# Patient Record
Sex: FEMALE | Race: WHITE | NOT HISPANIC OR LATINO | Employment: FULL TIME | URBAN - METROPOLITAN AREA
[De-identification: names, ages, dates, MRNs, and addresses within clinical notes are randomized per-mention and may not be internally consistent; named-entity substitution may affect disease eponyms.]

---

## 2019-10-25 ENCOUNTER — HOSPITAL ENCOUNTER (EMERGENCY)
Facility: MEDICAL CENTER | Age: 44
End: 2019-10-25

## 2019-10-25 VITALS
DIASTOLIC BLOOD PRESSURE: 81 MMHG | BODY MASS INDEX: 34.97 KG/M2 | OXYGEN SATURATION: 98 % | SYSTOLIC BLOOD PRESSURE: 129 MMHG | TEMPERATURE: 96.1 F | HEART RATE: 77 BPM | WEIGHT: 217.59 LBS | HEIGHT: 66 IN | RESPIRATION RATE: 18 BRPM

## 2019-10-25 PROCEDURE — 302449 STATCHG TRIAGE ONLY (STATISTIC)

## 2019-10-25 ASSESSMENT — PAIN SCALES - WONG BAKER: WONGBAKER_NUMERICALRESPONSE: DOESN'T HURT AT ALL

## 2019-12-05 ENCOUNTER — HOSPITAL ENCOUNTER (EMERGENCY)
Facility: MEDICAL CENTER | Age: 44
End: 2019-12-06
Attending: EMERGENCY MEDICINE
Payer: MEDICAID

## 2019-12-05 ENCOUNTER — HOSPITAL ENCOUNTER (EMERGENCY)
Facility: MEDICAL CENTER | Age: 44
End: 2019-12-05
Attending: EMERGENCY MEDICINE
Payer: MEDICAID

## 2019-12-05 VITALS
WEIGHT: 225.97 LBS | SYSTOLIC BLOOD PRESSURE: 174 MMHG | RESPIRATION RATE: 20 BRPM | TEMPERATURE: 98.6 F | OXYGEN SATURATION: 99 % | DIASTOLIC BLOOD PRESSURE: 96 MMHG | HEART RATE: 120 BPM | BODY MASS INDEX: 36.47 KG/M2

## 2019-12-05 VITALS
HEART RATE: 107 BPM | HEIGHT: 66 IN | SYSTOLIC BLOOD PRESSURE: 153 MMHG | DIASTOLIC BLOOD PRESSURE: 81 MMHG | TEMPERATURE: 99.1 F | OXYGEN SATURATION: 96 % | WEIGHT: 223.99 LBS | BODY MASS INDEX: 36 KG/M2 | RESPIRATION RATE: 16 BRPM

## 2019-12-05 DIAGNOSIS — K08.89 PAIN, DENTAL: ICD-10-CM

## 2019-12-05 PROCEDURE — A9270 NON-COVERED ITEM OR SERVICE: HCPCS | Performed by: EMERGENCY MEDICINE

## 2019-12-05 PROCEDURE — 700102 HCHG RX REV CODE 250 W/ 637 OVERRIDE(OP): Performed by: EMERGENCY MEDICINE

## 2019-12-05 PROCEDURE — 99284 EMERGENCY DEPT VISIT MOD MDM: CPT

## 2019-12-05 PROCEDURE — 700111 HCHG RX REV CODE 636 W/ 250 OVERRIDE (IP): Performed by: EMERGENCY MEDICINE

## 2019-12-05 PROCEDURE — 96372 THER/PROPH/DIAG INJ SC/IM: CPT

## 2019-12-05 PROCEDURE — 99281 EMR DPT VST MAYX REQ PHY/QHP: CPT

## 2019-12-05 RX ORDER — AMOXICILLIN AND CLAVULANATE POTASSIUM 875; 125 MG/1; MG/1
1 TABLET, FILM COATED ORAL ONCE
Status: COMPLETED | OUTPATIENT
Start: 2019-12-05 | End: 2019-12-05

## 2019-12-05 RX ORDER — KETOROLAC TROMETHAMINE 30 MG/ML
15 INJECTION, SOLUTION INTRAMUSCULAR; INTRAVENOUS ONCE
Status: COMPLETED | OUTPATIENT
Start: 2019-12-05 | End: 2019-12-05

## 2019-12-05 RX ORDER — IBUPROFEN 600 MG/1
600 TABLET ORAL EVERY 6 HOURS PRN
Qty: 30 TAB | Refills: 0 | Status: SHIPPED | OUTPATIENT
Start: 2019-12-05

## 2019-12-05 RX ORDER — AMOXICILLIN AND CLAVULANATE POTASSIUM 875; 125 MG/1; MG/1
1 TABLET, FILM COATED ORAL 4 TIMES DAILY
Qty: 40 TAB | Refills: 0 | Status: SHIPPED | OUTPATIENT
Start: 2019-12-05 | End: 2019-12-15

## 2019-12-05 RX ADMIN — AMOXICILLIN AND CLAVULANATE POTASSIUM 1 TABLET: 875; 125 TABLET, FILM COATED ORAL at 02:19

## 2019-12-05 RX ADMIN — KETOROLAC TROMETHAMINE 15 MG: 30 INJECTION, SOLUTION INTRAMUSCULAR at 02:19

## 2019-12-05 ASSESSMENT — LIFESTYLE VARIABLES: DO YOU DRINK ALCOHOL: NO

## 2019-12-05 NOTE — ED NOTES
"Update to history of present illness: patient has a dental partial at top left that has been loose for 1x month, states she had to re-glue it every day (denture adhesive).  Says it looks \"red and irritated.\"  Also reports that the cap smelled really bad when it first came out.    "

## 2019-12-05 NOTE — ED TRIAGE NOTES
"Chief Complaint   Patient presents with   • Facial Pain     patient ambulatory to triage, states her face hurts, denies any trauma, says she is having sinus pain and a headache. Claims she was sick recently. Denies any productive cough, fevers or n/v     /99   Pulse (!) 115   Temp 37.3 °C (99.1 °F) (Oral)   Resp 18   Ht 1.676 m (5' 6\")   Wt 101.6 kg (223 lb 15.8 oz)   SpO2 94%     Patient educated on ed triage process, instructed to notify staff of any new or worsening symptoms  "

## 2019-12-05 NOTE — ED NOTES
DC home with written and verbal instructions regarding f/u, activity and RX.  Verbalized understanding, ambulated out with steady gait, all belongings and ice pack.

## 2019-12-05 NOTE — ED PROVIDER NOTES
ED Provider Note    CHIEF COMPLAINT  Chief Complaint   Patient presents with   • Facial Pain     patient ambulatory to triage, states her face hurts, denies any trauma, says she is having sinus pain and a headache. Claims she was sick recently. Denies any productive cough, fevers or n/v       HPI  Christinia Lynn Schoenauer is a 44 y.o. female who presents with cc of dental pain. Patient reports that she has had dental pain.  Patient had a partial placed remotely, she reports it fell out and she has been using dental cement to help keep it in place, she reports it continues to fall out and she is developed considerable dental pain over the last few days.  She denies any facial swelling.  She denies any eye pain or pain on eye movements.  She reports some mild pain at the lip immediately overlying the infected tooth.  The tooth is the left canine.  She denies any difficulty breathing or swallowing.  She denies any fevers or constitutional symptoms.    REVIEW OF SYSTEMS  ROS  See HPI for further details. All other systems are negative.     PAST MEDICAL HISTORY       SOCIAL HISTORY  Social History     Tobacco Use   • Smoking status: Current Every Day Smoker     Packs/day: 0.50     Types: Cigarettes   • Smokeless tobacco: Never Used   Substance and Sexual Activity   • Alcohol use: Not Currently   • Drug use: Never   • Sexual activity: Not on file       SURGICAL HISTORY  patient denies any surgical history    CURRENT MEDICATIONS  Home Medications     Reviewed by Allison Vigil R.N. (Registered Nurse) on 12/05/19 at 0117  Med List Status: <None>   Medication Last Dose Status        Patient Mode Taking any Medications                       ALLERGIES  No Known Allergies    PHYSICAL EXAM  Physical Exam   Constitutional: She is oriented to person, place, and time. She appears well-developed and well-nourished.   HENT:   Head: Normocephalic and atraumatic.   Left canine with partial implant in place, I had patient remove  this which reveals a tooth with associated caries and associated buccal abscess.  There is some mild pain in the lip overlying this area but no pain at the maxillary sinus on percussion.  Full range of motion of extraocular muscles without any pain evoked.  Sclerae unremarkable.   Eyes: Conjunctivae are normal.   Neck: Normal range of motion. Neck supple.   Pulmonary/Chest: Effort normal.   Neurological: She is alert and oriented to person, place, and time.   Skin: Skin is warm.   Psychiatric: She has a normal mood and affect. Her behavior is normal.         COURSE & MEDICAL DECISION MAKING  Pertinent Labs & Imaging studies reviewed. (See chart for details)  Patient here with dental infection, will give Augmentin and Toradol.  I will discuss the case with oral surgery to ensure patient can get urgent follow-up for dental extraction given the location of the tooth.  I discussed the case with Dr. Ackerman reports he will see patient in his office tomorrow for extraction.  Patient understands that if for the reason she cannot follow-up with Dr. Ackerman to return to the emergency department.  Patient discharged home with Augmentin and Toradol for pain.  The patient will return for worsening symptoms and is stable at the time of discharge. The patient verbalizes understanding and will comply.    FINAL IMPRESSION    1. Pain, dental          Electronically signed by: Elias Quiroz, 12/5/2019 1:56 AM

## 2019-12-06 ENCOUNTER — HOSPITAL ENCOUNTER (EMERGENCY)
Facility: MEDICAL CENTER | Age: 44
End: 2019-12-06
Attending: EMERGENCY MEDICINE
Payer: MEDICAID

## 2019-12-06 ENCOUNTER — APPOINTMENT (OUTPATIENT)
Dept: RADIOLOGY | Facility: MEDICAL CENTER | Age: 44
End: 2019-12-06
Attending: EMERGENCY MEDICINE
Payer: MEDICAID

## 2019-12-06 VITALS
HEIGHT: 66 IN | WEIGHT: 225.97 LBS | BODY MASS INDEX: 36.32 KG/M2 | TEMPERATURE: 97.9 F | HEART RATE: 89 BPM | OXYGEN SATURATION: 99 % | DIASTOLIC BLOOD PRESSURE: 74 MMHG | SYSTOLIC BLOOD PRESSURE: 121 MMHG | RESPIRATION RATE: 16 BRPM

## 2019-12-06 DIAGNOSIS — K04.7 DENTAL INFECTION: ICD-10-CM

## 2019-12-06 LAB
ANION GAP SERPL CALC-SCNC: 11 MMOL/L (ref 0–11.9)
BASOPHILS # BLD AUTO: 0.5 % (ref 0–1.8)
BASOPHILS # BLD: 0.05 K/UL (ref 0–0.12)
BUN SERPL-MCNC: 11 MG/DL (ref 8–22)
CALCIUM SERPL-MCNC: 9.1 MG/DL (ref 8.4–10.2)
CHLORIDE SERPL-SCNC: 104 MMOL/L (ref 96–112)
CO2 SERPL-SCNC: 23 MMOL/L (ref 20–33)
CREAT SERPL-MCNC: 0.61 MG/DL (ref 0.5–1.4)
EOSINOPHIL # BLD AUTO: 0.34 K/UL (ref 0–0.51)
EOSINOPHIL NFR BLD: 3.3 % (ref 0–6.9)
ERYTHROCYTE [DISTWIDTH] IN BLOOD BY AUTOMATED COUNT: 44.6 FL (ref 35.9–50)
GLUCOSE SERPL-MCNC: 109 MG/DL (ref 65–99)
HCT VFR BLD AUTO: 46.8 % (ref 37–47)
HGB BLD-MCNC: 15.4 G/DL (ref 12–16)
IMM GRANULOCYTES # BLD AUTO: 0.04 K/UL (ref 0–0.11)
IMM GRANULOCYTES NFR BLD AUTO: 0.4 % (ref 0–0.9)
LYMPHOCYTES # BLD AUTO: 2.66 K/UL (ref 1–4.8)
LYMPHOCYTES NFR BLD: 26 % (ref 22–41)
MCH RBC QN AUTO: 29.3 PG (ref 27–33)
MCHC RBC AUTO-ENTMCNC: 32.9 G/DL (ref 33.6–35)
MCV RBC AUTO: 89 FL (ref 81.4–97.8)
MONOCYTES # BLD AUTO: 0.61 K/UL (ref 0–0.85)
MONOCYTES NFR BLD AUTO: 6 % (ref 0–13.4)
NEUTROPHILS # BLD AUTO: 6.54 K/UL (ref 2–7.15)
NEUTROPHILS NFR BLD: 63.8 % (ref 44–72)
NRBC # BLD AUTO: 0 K/UL
NRBC BLD-RTO: 0 /100 WBC
PLATELET # BLD AUTO: 278 K/UL (ref 164–446)
PMV BLD AUTO: 10.5 FL (ref 9–12.9)
POTASSIUM SERPL-SCNC: 4.7 MMOL/L (ref 3.6–5.5)
RBC # BLD AUTO: 5.26 M/UL (ref 4.2–5.4)
SODIUM SERPL-SCNC: 138 MMOL/L (ref 135–145)
WBC # BLD AUTO: 10.2 K/UL (ref 4.8–10.8)

## 2019-12-06 PROCEDURE — 700105 HCHG RX REV CODE 258: Performed by: EMERGENCY MEDICINE

## 2019-12-06 PROCEDURE — 70487 CT MAXILLOFACIAL W/DYE: CPT

## 2019-12-06 PROCEDURE — 96375 TX/PRO/DX INJ NEW DRUG ADDON: CPT

## 2019-12-06 PROCEDURE — 700111 HCHG RX REV CODE 636 W/ 250 OVERRIDE (IP)

## 2019-12-06 PROCEDURE — 85025 COMPLETE CBC W/AUTO DIFF WBC: CPT

## 2019-12-06 PROCEDURE — 700111 HCHG RX REV CODE 636 W/ 250 OVERRIDE (IP): Performed by: EMERGENCY MEDICINE

## 2019-12-06 PROCEDURE — 99284 EMERGENCY DEPT VISIT MOD MDM: CPT

## 2019-12-06 PROCEDURE — 80048 BASIC METABOLIC PNL TOTAL CA: CPT

## 2019-12-06 PROCEDURE — 96365 THER/PROPH/DIAG IV INF INIT: CPT

## 2019-12-06 PROCEDURE — 700117 HCHG RX CONTRAST REV CODE 255: Performed by: EMERGENCY MEDICINE

## 2019-12-06 RX ORDER — MORPHINE SULFATE 4 MG/ML
4 INJECTION, SOLUTION INTRAMUSCULAR; INTRAVENOUS ONCE
Status: DISCONTINUED | OUTPATIENT
Start: 2019-12-06 | End: 2019-12-06 | Stop reason: HOSPADM

## 2019-12-06 RX ORDER — KETOROLAC TROMETHAMINE 30 MG/ML
INJECTION, SOLUTION INTRAMUSCULAR; INTRAVENOUS
Status: COMPLETED
Start: 2019-12-06 | End: 2019-12-06

## 2019-12-06 RX ORDER — KETOROLAC TROMETHAMINE 30 MG/ML
30 INJECTION, SOLUTION INTRAMUSCULAR; INTRAVENOUS ONCE
Status: COMPLETED | OUTPATIENT
Start: 2019-12-06 | End: 2019-12-06

## 2019-12-06 RX ORDER — LIDOCAINE HYDROCHLORIDE AND EPINEPHRINE 10; 10 MG/ML; UG/ML
INJECTION, SOLUTION INFILTRATION; PERINEURAL
Status: DISCONTINUED
Start: 2019-12-06 | End: 2019-12-06 | Stop reason: HOSPADM

## 2019-12-06 RX ADMIN — SODIUM CHLORIDE 3 G: 900 INJECTION INTRAVENOUS at 01:57

## 2019-12-06 RX ADMIN — KETOROLAC TROMETHAMINE 30 MG: 30 INJECTION, SOLUTION INTRAMUSCULAR; INTRAVENOUS at 02:34

## 2019-12-06 RX ADMIN — IOHEXOL 80 ML: 350 INJECTION, SOLUTION INTRAVENOUS at 03:11

## 2019-12-06 RX ADMIN — KETOROLAC TROMETHAMINE 30 MG: 30 INJECTION, SOLUTION INTRAMUSCULAR at 02:34

## 2019-12-06 NOTE — ED NOTES
Attempting to page Dr. Calvert, the on call doctor for Oral Surgery @ 0332. The Office's paging service explains that  is not on call, however this doctor is listed on call sheets for both [facility] and [facility].   Dr. Doherty answering service calls back to explain that Dr. Ackerman is on call for Dr. Calvert, but Dr. Ackerman's call service explains that  is not on call. Dr. Calvert was supposedly paged by the service at 0332, [phone] and [phone]. The schedule was confirmed with the [facility] and the [facility] who also called the call service. There has still been no contact with the on call doctor at this time. We will continue to page the doctor for the new consultation.

## 2019-12-06 NOTE — ED NOTES
Pt left with out treatment being performed.  Walking wit well balanced gait, speaking in full obscene sentences.

## 2019-12-06 NOTE — ED TRIAGE NOTES
Pt ambulatory to triage c/o dental/facial pain swelling to left maxilla. Pt states she was seen her last night and told has dental abscess and placed on antibiotic and ibuprofen but states pain and swelling are getting worse. Pt crying in triage.

## 2019-12-06 NOTE — ED PROVIDER NOTES
ED Provider Note    CHIEF COMPLAINT  Chief Complaint   Patient presents with   • Facial Pain   • Dental Pain       HPI  Christinia Lynn Schoenauer is a 44 y.o. female who presents with dental pain to the left canine.  She was here yesterday and oral surgery was consulted.  She was discharged with Augmentin, Toradol, instructions to follow-up with oral surgery.  She is here today with worsening pain.  States that she cannot follow-up with oral surgery on an outpatient basis due to affordability.  She states that she feels like her face is slightly swollen as well to the left maxillary region.  No fevers.  No vomiting.  No eye pain or change in vision or double vision.    REVIEW OF SYSTEMS  See HPI for further details. All other systems are negative.     PAST MEDICAL HISTORY       SOCIAL HISTORY  Social History     Tobacco Use   • Smoking status: Current Every Day Smoker     Packs/day: 0.50     Types: Cigarettes   • Smokeless tobacco: Never Used   Substance and Sexual Activity   • Alcohol use: Not Currently   • Drug use: Never   • Sexual activity: Not on file       SURGICAL HISTORY  patient denies any surgical history    CURRENT MEDICATIONS  Home Medications     Reviewed by Evelyn De Oliveira R.N. (Registered Nurse) on 12/05/19 at 2320  Med List Status: Complete   Medication Last Dose Status   amoxicillin-clavulanate (AUGMENTIN) 875-125 MG Tab 12/5/2019 Active   ibuprofen (MOTRIN) 600 MG Tab 12/5/2019 Active                ALLERGIES  No Known Allergies    PHYSICAL EXAM  VITAL SIGNS: BP (!) 174/96   Pulse (!) 120   Temp 37 °C (98.6 °F) (Oral)   Resp 20   Wt 102.5 kg (225 lb 15.5 oz)   LMP 12/02/2019 (Exact Date)   SpO2 99%   BMI 36.47 kg/m²   Pulse ox interpretation: I interpret this pulse ox as normal.  Constitutional: Alert in no apparent distress.  HENT: No signs of trauma, Bilateral external ears normal, Nose normal.  Severe decay to the left upper canine tooth with tenderness to palpation.  No palpable  abscess along the gumline.  No actual facial swelling or erythema.  Eyes: Pupils are equal and reactive, Conjunctiva normal, Non-icteric.  Normal extraocular muscle movements without any diplopia.  Neck: Normal range of motion, No tenderness, Supple, No stridor.   Cardiovascular: Regular rate and rhythm.   Thorax & Lungs: Normal breath sounds, No respiratory distress, No wheezing  Skin: Warm, Dry, No erythema, No rash.   Neurologic: Alert, Normal motor function and gait, Normal sensory function, No focal deficits noted.       DIAGNOSTIC STUDIES / PROCEDURES      LABS  Labs Reviewed - No data to display      RADIOLOGY  No orders to display         COURSE & MEDICAL DECISION MAKING    Medications - No data to display    Pertinent Labs & Imaging studies reviewed. (See chart for details)  44 y.o. female presenting with facial pain and dental pain.  Was seen here yesterday for similar symptoms.  Was treated with Augmentin and Toradol and instructed to follow-up with an oral surgeon who was contacted yesterday for follow-up.  Patient states that she could not afford the cost of the visit and so she did not go to the oral surgeon's office.  She is here for worsening dental pain and states that she feels like her face is swollen.  The patient does not have significant facial swelling upon my bedside examination.  No evidence of extraocular muscle deficits.  No diplopia.  No facial erythema.  Does have erythema along the gumline though no palpable discrete pocket of pus to suggest abscess there.  Suspect possible infected pulpitis versus early periapical abscess.  However given the patient's concern for facial pain and subjective swelling, I did order a more thorough examination including laboratory studies and CT maxillofacial study along with IV morphine to help control her pain symptoms.  This was to help rule out potential deep space infection in the face.    After ordering the laboratory studies, imaging, pain  medications, the patient eloped from the emergency department.  She left the emergency department without informing the staff and left prior to complete medical evaluation.      Medical work-up was incomplete secondary to the patient's elopement.      BP (!) 174/96   Pulse (!) 120   Temp 37 °C (98.6 °F) (Oral)   Resp 20   Wt 102.5 kg (225 lb 15.5 oz)   LMP 12/02/2019 (Exact Date)   SpO2 99%   BMI 36.47 kg/m²     No follow-up provider specified.    FINAL IMPRESSION  Dental pain    Electronically signed by: Lisandro Simons, 12/6/2019 12:19 AM

## 2019-12-06 NOTE — ED PROVIDER NOTES
ED Provider Note    CHIEF COMPLAINT  Chief Complaint   Patient presents with   • Dental Pain       HPI  Christinia Lynn Schoenauer is a 44 y.o. female who presents with ongoing facial pain secondary to dental abscess.  Patient was seen by me at [facility] last night for identical complaint.  I obtained follow-up with patient for later that afternoon for dental extraction by oral surgery however patient was told that her insurance would not cover this appointment and therefore patient did not go.  She went to [facility] but left prior to being seen by the physician, she then came in here for further care.  She reports she is having ongoing facial pain, she continues to deny any difficulty swallowing or breathing.  She denies any fever.    REVIEW OF SYSTEMS  ROS  See HPI for further details. All other systems are negative.     PAST MEDICAL HISTORY       SOCIAL HISTORY  Social History     Tobacco Use   • Smoking status: Current Every Day Smoker     Packs/day: 0.50     Types: Cigarettes   • Smokeless tobacco: Never Used   Substance and Sexual Activity   • Alcohol use: Not Currently   • Drug use: Never   • Sexual activity: Not on file       SURGICAL HISTORY  patient denies any surgical history    CURRENT MEDICATIONS  Home Medications    **Home medications have not yet been reviewed for this encounter**         ALLERGIES  No Known Allergies    PHYSICAL EXAM  Physical Exam   Constitutional: She is oriented to person, place, and time. She appears well-developed and well-nourished.   HENT:   Head: Normocephalic and atraumatic.   Left canine with associated caries and associated buccal abscess.  There is now some mild pain at patient's left maxillary sinus, full range of motion of extraocular muscles without any pain evoked.  Sclerae unremarkable. no trismus, no obvious facial swelling, no floor of mouth swelling or submandibular fullness, no stridor. No pharyngeal asymmetry. Nl phonation     Eyes:  Conjunctivae are normal.   Neck: Normal range of motion. Neck supple.   Pulmonary/Chest: Effort normal.   Neurological: She is alert and oriented to person, place, and time.   Skin: Skin is warm.   Psychiatric: She has a normal mood and affect. Her behavior is normal.         COURSE & MEDICAL DECISION MAKING  Pertinent Labs & Imaging studies reviewed. (See chart for details)  Patient returns with ongoing symptoms, she was unable to see oral surgery as an outpatient secondary to insurance issues.  Will CT to evaluate for extent of abscess and call oral surgery for management.  Patient CT reveals abscess with involvement of his maxillary sinus.  I have paged the oral surgeon on call who was Dr. apodaca however Dr. Ackerman is actually covering for him at this point.  There was considerable delay secondary to the on-call service not knowing who was actually on call but we were finally able to get a hold of Dr. Ackerman who will perform bedside dental extraction.  Patient will be signed out to the oncoming emergency physician  She has prescriptions that I gave her yesterday for antibiotics and pain medicine.   The patient will return for worsening symptoms and is stable at the time of discharge. The patient verbalizes understanding and will comply.    FINAL IMPRESSION  1. Dental abscess    Electronically signed by: Elias Quiroz, 12/6/2019 1:13 AM

## 2019-12-06 NOTE — ED TRIAGE NOTES
Patient c/o dental pain since yesterday. She was [place] [facility] [place] and given Augmentin and 800mg ibuprofen. She returns denying any relief.

## 2019-12-07 ENCOUNTER — HOSPITAL ENCOUNTER (EMERGENCY)
Facility: MEDICAL CENTER | Age: 44
End: 2019-12-07
Attending: EMERGENCY MEDICINE
Payer: MEDICAID

## 2019-12-07 VITALS
RESPIRATION RATE: 20 BRPM | HEART RATE: 94 BPM | TEMPERATURE: 97.6 F | HEIGHT: 66 IN | DIASTOLIC BLOOD PRESSURE: 67 MMHG | WEIGHT: 224.87 LBS | BODY MASS INDEX: 36.14 KG/M2 | OXYGEN SATURATION: 97 % | SYSTOLIC BLOOD PRESSURE: 124 MMHG

## 2019-12-07 DIAGNOSIS — K04.7 DENTAL INFECTION: ICD-10-CM

## 2019-12-07 DIAGNOSIS — R22.0 FACIAL SWELLING: ICD-10-CM

## 2019-12-07 PROCEDURE — 99283 EMERGENCY DEPT VISIT LOW MDM: CPT

## 2019-12-07 RX ORDER — HYDROCODONE BITARTRATE AND ACETAMINOPHEN 5; 325 MG/1; MG/1
1-2 TABLET ORAL EVERY 4 HOURS PRN
COMMUNITY

## 2019-12-07 RX ORDER — AMOXICILLIN 500 MG/1
500 CAPSULE ORAL 3 TIMES DAILY
COMMUNITY
Start: 2019-12-06

## 2019-12-07 ASSESSMENT — LIFESTYLE VARIABLES
TOTAL SCORE: 0
EVER FELT BAD OR GUILTY ABOUT YOUR DRINKING: NO
DOES PATIENT WANT TO STOP DRINKING: NO
TOTAL SCORE: 0
CONSUMPTION TOTAL: NEGATIVE
DO YOU DRINK ALCOHOL: NO
AVERAGE NUMBER OF DAYS PER WEEK YOU HAVE A DRINK CONTAINING ALCOHOL: 0
TOTAL SCORE: 0
HAVE PEOPLE ANNOYED YOU BY CRITICIZING YOUR DRINKING: NO
HOW MANY TIMES IN THE PAST YEAR HAVE YOU HAD 5 OR MORE DRINKS IN A DAY: 0
HAVE YOU EVER FELT YOU SHOULD CUT DOWN ON YOUR DRINKING: NO
ON A TYPICAL DAY WHEN YOU DRINK ALCOHOL HOW MANY DRINKS DO YOU HAVE: 0
EVER HAD A DRINK FIRST THING IN THE MORNING TO STEADY YOUR NERVES TO GET RID OF A HANGOVER: NO

## 2019-12-07 NOTE — ED NOTES
All discharge instructions given to pt.  Pt advised not to drink or drive.  Pt verbalized understanding of all discharge instructions. All questions answered.

## 2019-12-07 NOTE — DISCHARGE INSTRUCTIONS
You were seen in the emergency department for swelling in the face in the setting of an infection with tooth extraction.  The swelling you are having is normal postoperative swelling.  Please continue to take your medications as prescribed.  You may use cool compresses on your face to help with the swelling.    You have 2 prescriptions for antibiotics.  These contain the same medication.  Please take the combined amoxicillin-clavulanate as prescribed.  Please do not take your other amoxicillin only prescription as this is unnecessary.    Please follow-up with your dentist.    Please return to the emergency department or seek medical attention if you develop:  Difficulty breathing, severe worsening swelling, fevers, any other new or concerning findings

## 2019-12-07 NOTE — ED NOTES
Med rec completed per pt and RX bottles (returned)  Allergies reviewed    Pt started taking a 10 day course of Augmentin on 12/5/19    Pt started taking a 7 day course of Amoxicillin on 12/6/19

## 2019-12-07 NOTE — ED PROVIDER NOTES
"ED Provider Note        History obtained from: Patient, medical record    CHIEF COMPLAINT  Chief Complaint   Patient presents with   • Oral Swelling       [facility]  Christinia Lynn Schoenauer is a 44 y.o. female who presents with left-sided facial swelling.  The patient has been treated and seen here for the past several days for facial swelling with a facial cellulitis on CAT scan yesterday.  The patient went to surgery yesterday for a dental extraction.  This was successful, however this morning the patient noted increasing swelling in the left side of her face.  She denies any fevers, chills, difficulty swallowing, stridor, difficulty breathing, or other dangerous findings.  She has been taking Augmentin as well as amoxicillin without clavulanate.    REVIEW OF SYSTEMS    CONSTITUTIONAL:  No fever.  CARDIOVASCULAR:  No chest discomfort.  RESPIRATORY:  No pleuritic chest pain.  GASTROINTESTINAL:  No abdominal pain.    See HPI for further details.       PAST MEDICAL HISTORY  History reviewed. No pertinent past medical history.    FAMILY HISTORY  History reviewed. No pertinent family history.    SOCIAL HISTORY   reports that she has been smoking cigarettes. She has been smoking about 0.50 packs per day. She has never used smokeless tobacco. She reports previous alcohol use. She reports that she does not use drugs.    SURGICAL HISTORY  History reviewed. No pertinent surgical history.    CURRENT MEDICATIONS  Home Medications    **Home medications have not yet been reviewed for this encounter**         ALLERGIES  No Known Allergies    PHYSICAL EXAM  VITAL SIGNS: /67   Pulse 90   Temp 36.2 °C (97.2 °F) (Temporal)   Resp 20   Ht 1.676 m (5' 6\")   Wt 102 kg (224 lb 13.9 oz)   LMP 12/02/2019 (Exact Date)   SpO2 96%   BMI 36.29 kg/m²    Gen: alert, no acute distress  HENT: ATNC.  Mild left-sided facial swelling.  No stridor.  No oropharyngeal swelling.  Soft sublingual space.  Surgically absent tooth approximately " #11 with minimal gingival swelling.  Eyes: normal conjuctiva  Resp: No resipiratory distress.   CV: No JVD, regular rate and rhythm  Abd: Non-distended  Extremities: No deformity            COURSE & MEDICAL DECISION MAKING  Pertinent Labs & Imaging studies reviewed. (See chart for details)    Patient resents with left-sided facial swelling.  No evidence of Raz's angina, facial abscess or other findings.  This appears to be normal postoperative swelling in the setting of a infection.  She was advised that she does not need to take duplicate antibiotics and is advised to take the Augmentin.  She was given return precautions.  Case discussed with Dr. Elder, who agrees with plan and if she continues to have swelling the next week we will see her in the office.    FINAL IMPRESSION  1. Facial swelling    2. Dental infection

## 2019-12-07 NOTE — ED TRIAGE NOTES
"Pt was seen yesterday by DR. Quiroz for evaluation and management of facial swelling secondary to a dental abscess.  She returns this AM with same complaints.  Chief Complaint   Patient presents with   • Oral Swelling     /67   Pulse 90   Temp 36.2 °C (97.2 °F) (Temporal)   Resp 20   Ht 1.676 m (5' 6\")   Wt 102 kg (224 lb 13.9 oz)   LMP 12/02/2019 (Exact Date)   SpO2 96%   BMI 36.29 kg/m²     "